# Patient Record
Sex: FEMALE | Race: WHITE | NOT HISPANIC OR LATINO | Employment: FULL TIME | URBAN - METROPOLITAN AREA
[De-identification: names, ages, dates, MRNs, and addresses within clinical notes are randomized per-mention and may not be internally consistent; named-entity substitution may affect disease eponyms.]

---

## 2022-08-05 ENCOUNTER — HOSPITAL ENCOUNTER (EMERGENCY)
Facility: CLINIC | Age: 22
Discharge: HOME | End: 2022-08-05
Attending: PHYSICIAN ASSISTANT | Admitting: PHYSICIAN ASSISTANT
Payer: OTHER MISCELLANEOUS

## 2022-08-05 VITALS
HEART RATE: 90 BPM | TEMPERATURE: 98.7 F | RESPIRATION RATE: 18 BRPM | WEIGHT: 140 LBS | HEIGHT: 64 IN | BODY MASS INDEX: 23.9 KG/M2 | OXYGEN SATURATION: 99 %

## 2022-08-05 DIAGNOSIS — S61.412A LACERATION OF LEFT HAND WITHOUT FOREIGN BODY, INITIAL ENCOUNTER: ICD-10-CM

## 2022-08-05 PROCEDURE — 99283 EMERGENCY DEPT VISIT LOW MDM: CPT | Mod: 25

## 2022-08-05 PROCEDURE — 90471 IMMUNIZATION ADMIN: CPT | Performed by: PHYSICIAN ASSISTANT

## 2022-08-05 PROCEDURE — 12001 RPR S/N/AX/GEN/TRNK 2.5CM/<: CPT

## 2022-08-05 PROCEDURE — 90715 TDAP VACCINE 7 YRS/> IM: CPT | Performed by: PHYSICIAN ASSISTANT

## 2022-08-05 PROCEDURE — 250N000011 HC RX IP 250 OP 636: Performed by: PHYSICIAN ASSISTANT

## 2022-08-05 RX ADMIN — CLOSTRIDIUM TETANI TOXOID ANTIGEN (FORMALDEHYDE INACTIVATED), CORYNEBACTERIUM DIPHTHERIAE TOXOID ANTIGEN (FORMALDEHYDE INACTIVATED), BORDETELLA PERTUSSIS TOXOID ANTIGEN (GLUTARALDEHYDE INACTIVATED), BORDETELLA PERTUSSIS FILAMENTOUS HEMAGGLUTININ ANTIGEN (FORMALDEHYDE INACTIVATED), BORDETELLA PERTUSSIS PERTACTIN ANTIGEN, AND BORDETELLA PERTUSSIS FIMBRIAE 2/3 ANTIGEN 0.5 ML: 5; 2; 2.5; 5; 3; 5 INJECTION, SUSPENSION INTRAMUSCULAR at 19:02

## 2022-08-05 ASSESSMENT — ENCOUNTER SYMPTOMS: NUMBNESS: 0

## 2022-08-05 NOTE — ED PROVIDER NOTES
"  History   Chief Complaint:  Laceration (Cut self with  left 2nd finger)     The history is provided by the patient.      Magdalena Muñoz is a 22 year old otherwise healthy right handed female female who presents with laceration. The patient states that at approximately 1300 today, at work, she was moving too fast and accidentally cut her left 2nd finger with a boxuctter. She adds that she immediately got her finger under water and taped it to attempt to stop the bleeding. She denies numbness or tingling. Her last Tdap was 09/15/2009.    Review of Systems   Neurological: Negative for numbness.   All other systems reviewed and are negative.    Allergies:  Sulfa (Sulfonamide Antibiotics) [Sulfa Drugs]  Nickel    Medications:  Etonogestrel    Past Medical History:     There is no problem list on file for this patient.    Family History:    PE-father    Social History:  Presents to ED via private vehicle.  Presents to ED alone.    Physical Exam     Patient Vitals for the past 24 hrs:   Temp Temp src Pulse Resp SpO2 Height Weight   08/05/22 1507 98.7  F (37.1  C) Temporal 90 18 99 % 1.626 m (5' 4\") 63.5 kg (140 lb)     Physical Exam  HEENT: mmm  Eyes: PERRL B/L  Neck: Supple  CV: Peripheral pulses intact and regular  Resp: Speaking in full sentences without any respiratory distress  Ext:  Left Hand  0.75cm laceration to lateral aspect of proximal phalanx of 2nd digit  No bony TTP.  Full ROM of fingers  <2 sec cap refill to all fingers  Sensation intact distally.  Remainder of the skeletal survey is unremarkable  Skin: warm dry well perfused  Neuro: Alert  Nursing notes and vital signs reviewed.      Emergency Department Course     Procedures       Laceration Repair      Procedure: Laceration Repair    Indication: Laceration    Consent: Verbal    Location: Left L second (index) finger    Length: 0.75 cm    Preparation: Irrigation with Tap Water.    Anesthesia/Sedation: Lidocaine - " 1%      Treatment/Exploration: Wound explored, no foreign bodies found     Closure: The wound was closed with one layer. Skin/superficial layer was closed with 2 x 5-0 using Ethilon sutures.     Patient Status: The patient tolerated the procedure well: Yes. There were no complications.    Emergency Department Course:     Reviewed:  I reviewed nursing notes, vitals, past medical history and Care Everywhere    Assessments:  1808 I obtained history and examined the patient as noted above.   1845 I rechecked the patient and performed laceration repair (see procedure note).     Interventions:  1902 Intramuscular 0.5mL IV    Disposition:  The patient was discharged to home.     Impression & Plan     Medical Decision Making:  Magdalena Muñoz is a 22 year old female who presents for evaluation of a laceration to the left 2nd finger.  The wound was carefully evaluated and explored.  The laceration was closed as noted above.  There is no evidence of muscular, tendon, or bony damage with this laceration.  No signs of foreign body.  Possible complications (infection, scarring) were reviewed with the patient.  Follow up with primary care as noted in the discharge section. Discussed reasons to return. All questions answered. Patient discharged to home in stable condition.    Diagnosis:    ICD-10-CM    1. Laceration of left hand without foreign body, initial encounter  S61.412A      Scribe Disclosure:  I, Cal Gallardo, am serving as a scribe at 5:57 PM on 8/5/2022 to document services personally performed by Ashwin Krishnamurthy PA-C based on my observations and the provider's statements to me.     This record was created at least in part using electronic voice recognition software, so please excuse any typographical errors.         Ashwin Krishnamurthy PA-C  08/05/22 2057     Never

## 2022-08-05 NOTE — DISCHARGE INSTRUCTIONS
Sutures need to be removed in 10 days.  Keep wound clean and covered with topical antibiotic and bandaid until sutures are removed.